# Patient Record
Sex: FEMALE | Race: WHITE | NOT HISPANIC OR LATINO | Employment: FULL TIME | URBAN - METROPOLITAN AREA
[De-identification: names, ages, dates, MRNs, and addresses within clinical notes are randomized per-mention and may not be internally consistent; named-entity substitution may affect disease eponyms.]

---

## 2019-04-05 PROBLEM — F41.9 ANXIETY AND DEPRESSION: Status: ACTIVE | Noted: 2019-03-19

## 2019-04-05 PROBLEM — F32.A ANXIETY AND DEPRESSION: Status: ACTIVE | Noted: 2019-03-19

## 2019-05-29 PROBLEM — J45.909 ASTHMA: Status: ACTIVE | Noted: 2019-05-29

## 2021-05-12 ENCOUNTER — PATIENT MESSAGE (OUTPATIENT)
Dept: RESEARCH | Facility: HOSPITAL | Age: 31
End: 2021-05-12

## 2025-06-25 ENCOUNTER — OFFICE VISIT (OUTPATIENT)
Dept: OBSTETRICS AND GYNECOLOGY | Facility: CLINIC | Age: 35
End: 2025-06-25
Payer: COMMERCIAL

## 2025-06-25 VITALS
BODY MASS INDEX: 40.85 KG/M2 | DIASTOLIC BLOOD PRESSURE: 70 MMHG | HEIGHT: 65 IN | WEIGHT: 245.19 LBS | SYSTOLIC BLOOD PRESSURE: 118 MMHG

## 2025-06-25 DIAGNOSIS — Z30.09 ENCOUNTER FOR OTHER GENERAL COUNSELING OR ADVICE ON CONTRACEPTION: Primary | ICD-10-CM

## 2025-06-25 LAB
B-HCG UR QL: NEGATIVE
CTP QC/QA: YES

## 2025-06-25 PROCEDURE — 81025 URINE PREGNANCY TEST: CPT | Mod: ,,,

## 2025-06-25 PROCEDURE — 99203 OFFICE O/P NEW LOW 30 MIN: CPT | Mod: ,,,

## 2025-06-25 NOTE — PROGRESS NOTES
Chief Complaint:  Contraception (Pt desires BC )    History of Present Illness:  Patient is a 35 y.o.  new patient, referral from Rosy GAMBOA, presents to discuss contraception. Currently relying on nothing. Previously was taking Junel Fe 1/20 but self discontinued about 1-2 months ago secondary to feeling different. LMP 25. Patient reports cyclic menses with a duration of 8 days, heavy flow, and moderate cramping.         Gyn History:  Menstrual History  Cycle: Yes (LMP:25)  Menarche Age: 1 years  Flow Duration: 8  Flow: (!) Heavy  Interval: 28  Intermenstrual Bleeding: No  Dysmenorrhea: Yes  Dysmenorrhea Severity : Moderate    Menopause  Menopause Age: 1 years    Pap History  Last pap date:  (Pt states she had one last year, no record found)  History of Abnormal Pap: No  HPV Vaccine Completed: No    [place]  Sexually Active: Yes  Sexual Orientation: heterosexual  Postcoital Bleeding: No  Dyspareunia: No  STI History: No  Contraception: No    Breast History  Last Breast Imaging Date: No  History of Breast Biopsy: No      Review of systems:  General/Constitutional: Chills denies. Fatigue/weakness denies. Fever denies. Night sweats denies. Hot flashes denies  Breast: Dimpling denies. Breast mass denies. Breast pain/tenderness denies. Nipple discharge denies. Puckering denies.  Gastrointestinal: Abdominal pain denies. Blood in stool denies. Constipation denies. Diarrhea denies. Heartburn denies. Nausea denies. Vomiting denies   Genitourinary: Incontinence denies. Blood in urine denies. Frequent urination denies. Urgency denies. Painful urination denies. Nocturia denies   Gynecologic: Irregular menses denies. Heavy bleeding denies. Painful menses denies. Vaginal discharge denies. Vaginal odor denies. Vaginal itching/Irritation denies. Vaginal lesion denies.  Pelvic pain denies. Decreased libido denies. Vulvar lesion denies. Prolapse of genital organs denies. Painful intercourse denies.  "Postcoital bleeding denies   Psychiatric: Mood lability denies. Depressed mood denies. Suicidal thoughts denies. Anxiety denies. Overwhelmed denies. Appetite normal. Energy level normal.     OB History    Para Term  AB Living   3 3 2 1 0 2   SAB IAB Ectopic Multiple Live Births   0 0 0 1 2      # 1 - Date: 07/20/10, Sex: Male, Weight: 4.139 kg (9 lb 2 oz), GA: 41w0d, Type: , Low Transverse, Apgar1: None, Apgar5: None, Living: Living, Birth Comments: None    # 2 - Date: 10/02/12, Sex: Female, Weight: 3.799 kg (8 lb 6 oz), GA: 39w0d, Type: , Low Transverse, Apgar1: None, Apgar5: None, Living: Living, Birth Comments: None    # 3A - Date: 14, Sex: Female, Weight: 2.778 kg (6 lb 2 oz), GA: 36w0d, Type: None, Apgar1: None, Apgar5: None, Living: None, Birth Comments: None  # 3B - Date: 14, Sex: Female, Weight: 2.551 kg (5 lb 10 oz), GA: 36w0d, Type: None, Apgar1: None, Apgar5: None, Living: None, Birth Comments: None      Past Medical History:   Diagnosis Date    Anxiety     Asthma     Depression      Current Medications[1]      Physical Exam:  /70 (BP Location: Right arm, Patient Position: Sitting)   Ht 5' 5" (1.651 m)   Wt 111.2 kg (245 lb 3.2 oz)   LMP 2025   BMI 40.80 kg/m²     Constitutional: General appearance: healthy, well-nourished and well-developed   Psychiatric:  Orientation to time, place and person. Normal mood and affect and active, alert       Assessment/Plan:  1. Encounter for contraceptive management  Educated  UPT negative    Explained common options for contraception including natural family planning, barrier methods, depo-provera, ocps, patch, nuvaring, IUD, Nexplanon, and sterilization.        Discussed with patient pills must be taken same time every day to minimize breakthrough bleeding and to expect breakthrough bleeding for the first 3 months and then it should resolve. If BTB continues after 3 months, a change may be necessary.    "     Advised patient that smoking is harmful due to increased risks of stroke, heart attack and blood clots when taking pills. Patient to contact us immediately if she experiences severe abdominal pain, severe chest pain, severe headaches, eye-visual changes, severe leg pain or SOB.       Discussed that birth control do not protect against STDs.  Patient desires tubal & ablation vs partner vasectomy     Educated on [facility] for partner vasectomy    If patient desires tubal/ablation, advised to call the office for appt with Dr. Ferraro for  surgical discussion for Tubal/ablation     RTC prn/annual     Future Appointments   Date Time Provider Department Center   7/1/2025  2:30 PM Kendal Allen, FNP-C [facility]       This note was transcribed by Liz Smith. There may be transcription errors as a result, however minimal. Effort has been made to ensure accuracy of transcription, but any obvious errors or omissions should be clarified with the author of the document.              [1]   Current Outpatient Medications:     acyclovir (ZOVIRAX) 400 MG tablet, Take 1 tablet (400 mg total) by mouth 3 (three) times daily. for 5 days, Disp: 15 tablet, Rfl: 1    ferrous sulfate (FEOSOL) Tab tablet, Take 1 tablet by mouth daily with breakfast., Disp: , Rfl:     fluticasone propionate (FLONASE) 50 mcg/actuation nasal spray, , Disp: , Rfl:     montelukast (SINGULAIR) 10 mg tablet, Take 10 mg by mouth every evening., Disp: , Rfl:     TRELEGY ELLIPTA 200-62.5-25 mcg inhaler, inhale 1 PUFF into THE lungs IN THE MORNING, Disp: , Rfl:     VENTOLIN HFA 90 mcg/actuation inhaler, INHALE 1-2 puffs EVERY 6 HOURS AS NEEDED FOR wheezing, Disp: 18 g, Rfl: 1    aspirin (ECOTRIN) 81 MG EC tablet, Take 81 mg by mouth once daily. (Patient not taking: Reported on 6/25/2025), Disp: , Rfl:     ergocalciferol (ERGOCALCIFEROL) 50,000 unit Cap, Take 50,000 Units by mouth. (Patient not taking: Reported on 6/25/2025), Disp: , Rfl: